# Patient Record
Sex: FEMALE | URBAN - METROPOLITAN AREA
[De-identification: names, ages, dates, MRNs, and addresses within clinical notes are randomized per-mention and may not be internally consistent; named-entity substitution may affect disease eponyms.]

---

## 2022-09-12 ENCOUNTER — WEB ENCOUNTER (OUTPATIENT)
Dept: URBAN - METROPOLITAN AREA CLINIC 23 | Facility: CLINIC | Age: 47
End: 2022-09-12

## 2022-09-13 ENCOUNTER — LAB OUTSIDE AN ENCOUNTER (OUTPATIENT)
Dept: URBAN - METROPOLITAN AREA CLINIC 23 | Facility: CLINIC | Age: 47
End: 2022-09-13

## 2022-09-13 ENCOUNTER — DASHBOARD ENCOUNTERS (OUTPATIENT)
Age: 47
End: 2022-09-13

## 2022-09-13 ENCOUNTER — OFFICE VISIT (OUTPATIENT)
Dept: URBAN - METROPOLITAN AREA CLINIC 23 | Facility: CLINIC | Age: 47
End: 2022-09-13
Payer: COMMERCIAL

## 2022-09-13 VITALS
BODY MASS INDEX: 36.99 KG/M2 | TEMPERATURE: 98.1 F | DIASTOLIC BLOOD PRESSURE: 84 MMHG | WEIGHT: 201 LBS | SYSTOLIC BLOOD PRESSURE: 153 MMHG | HEART RATE: 82 BPM | HEIGHT: 62 IN

## 2022-09-13 DIAGNOSIS — Z12.11 COLON CANCER SCREENING: ICD-10-CM

## 2022-09-13 DIAGNOSIS — K58.1 IRRITABLE BOWEL SYNDROME WITH PREDOMINANT CONSTIPATION: ICD-10-CM

## 2022-09-13 DIAGNOSIS — R13.19 DYSPHAGIA: ICD-10-CM

## 2022-09-13 DIAGNOSIS — R10.13 DYSPEPSIA: ICD-10-CM

## 2022-09-13 PROBLEM — 10743008 IRRITABLE BOWEL SYNDROME: Status: ACTIVE | Noted: 2022-09-13

## 2022-09-13 PROBLEM — 40739000 DYSPHAGIA: Status: ACTIVE | Noted: 2022-09-13

## 2022-09-13 PROCEDURE — 99204 OFFICE O/P NEW MOD 45 MIN: CPT | Performed by: INTERNAL MEDICINE

## 2022-09-13 PROCEDURE — 99244 OFF/OP CNSLTJ NEW/EST MOD 40: CPT | Performed by: INTERNAL MEDICINE

## 2022-09-13 NOTE — HPI-TODAY'S VISIT:
- 46 yo  female, referred by Ksenia Mandujano NP for colorectal cancer screening.  A copy of this note will be sent to the referring provider. - Patient has never had a colonoscopy in the past - Denies hematochezia, change in bowel habits, or weight loss - Denies family history of GI malignancies in any first degree relatives - Notes intermittent heartburn and dyspepsia symptoms, for which she takes Tums about once every 2 weeks  - She takes NSAIDs about 3 times a week for chronic knee pains related to prior knee surgeries - States she has chronic IBS, constipation-predominant.  She takes magnesium pills every other day.  She only has bowel movements 1-2 times per week. - Additional chronic GI symptoms include intermittent dysphagia about once a month, intermittent nausea and bloating

## 2023-01-09 ENCOUNTER — TELEPHONE ENCOUNTER (OUTPATIENT)
Dept: URBAN - METROPOLITAN AREA CLINIC 92 | Facility: CLINIC | Age: 48
End: 2023-01-09

## 2023-01-09 ENCOUNTER — OFFICE VISIT (OUTPATIENT)
Dept: URBAN - METROPOLITAN AREA MEDICAL CENTER 27 | Facility: MEDICAL CENTER | Age: 48
End: 2023-01-09
Payer: COMMERCIAL

## 2023-01-09 DIAGNOSIS — K22.89 DILATATION OF ESOPHAGUS: ICD-10-CM

## 2023-01-09 DIAGNOSIS — Z12.11 COLON CANCER SCREENING: ICD-10-CM

## 2023-01-09 DIAGNOSIS — K62.1 ANAL AND RECTAL POLYP: ICD-10-CM

## 2023-01-09 DIAGNOSIS — K29.60 ADENOPAPILLOMATOSIS GASTRICA: ICD-10-CM

## 2023-01-09 PROBLEM — 40719004 EROSIVE ESOPHAGITIS: Status: ACTIVE | Noted: 2023-01-09

## 2023-01-09 PROCEDURE — 43239 EGD BIOPSY SINGLE/MULTIPLE: CPT | Performed by: INTERNAL MEDICINE

## 2023-01-09 PROCEDURE — 45385 COLONOSCOPY W/LESION REMOVAL: CPT | Performed by: INTERNAL MEDICINE

## 2023-02-02 ENCOUNTER — TELEPHONE ENCOUNTER (OUTPATIENT)
Dept: URBAN - METROPOLITAN AREA CLINIC 23 | Facility: CLINIC | Age: 48
End: 2023-02-02

## 2023-02-02 RX ORDER — CLARITHROMYCIN 500 MG/1
1 TABLET TABLET, FILM COATED ORAL
Qty: 28 TABLETS | Refills: 0 | OUTPATIENT
Start: 2023-02-07 | End: 2023-02-21

## 2023-02-02 RX ORDER — PANTOPRAZOLE SODIUM 40 MG/1
1 TABLET, 30 MINUTES BEFORE BREAKFAST AND DINNER TABLET, DELAYED RELEASE ORAL TWICE A DAY
Qty: 28 | Refills: 0 | OUTPATIENT
Start: 2023-02-07

## 2023-02-02 RX ORDER — AMOXICILLIN 500 MG/1
2 TABLETS TABLET, FILM COATED ORAL TWICE A DAY
Qty: 56 TABLETS | Refills: 0 | OUTPATIENT
Start: 2023-02-07 | End: 2023-02-21